# Patient Record
Sex: MALE | Race: WHITE | NOT HISPANIC OR LATINO | Employment: FULL TIME | URBAN - METROPOLITAN AREA
[De-identification: names, ages, dates, MRNs, and addresses within clinical notes are randomized per-mention and may not be internally consistent; named-entity substitution may affect disease eponyms.]

---

## 2018-01-03 ENCOUNTER — OFFICE VISIT (OUTPATIENT)
Dept: URGENT CARE | Facility: CLINIC | Age: 28
End: 2018-01-03
Payer: COMMERCIAL

## 2018-01-03 PROCEDURE — G0382 LEV 3 HOSP TYPE B ED VISIT: HCPCS

## 2018-01-03 PROCEDURE — 99283 EMERGENCY DEPT VISIT LOW MDM: CPT

## 2018-01-04 NOTE — PROGRESS NOTES
Assessment   1  Sensation of foreign body in throat (629 21) (N82 94)    Discussion/Summary   Discussion Summary:    Pt to ED for further evaluation  Chief Complaint   1  Cough  Chief Complaint Free Text Note Form: Patient relates woke up coughing this morning and has not been sick  Feels like something is lodged in his throat  Speaking in full sentences no acute distress  History of Present Illness   HPI: A 43-year-old male presents with cough and foreign body sensation in throat since waking this am     Hospital Based Practices Required Assessment: Reason DV Screen not done: family at bedside       Depression And Suicide Screen  Reason suicide screen not done: family at bedside  Prefered Language is  english  Primary Language is  english  Review of Systems   Focused-Male:      Constitutional: no fever or chills, feels well, no tiredness, no recent weight loss or weight gain  ENT: no complaints of earache, no loss of hearing, no nosebleeds or nasal discharge, no sore throat or hoarseness  Cardiovascular: no complaints of slow or fast heart rate, no chest pain, no palpitations, no leg claudication or lower extremity edema  Respiratory: cough, but-- no shortness of breath,-- no orthopnea,-- no wheezing,-- no shortness of breath during exertion-- and-- no PND  Gastrointestinal: no complaints of abdominal pain, no constipation, no nausea or vomiting, no diarrhea or bloody stools  Genitourinary: no complaints of dysuria or incontinence, no hesitancy, no nocturia, no genital lesion, no inadequacy of penile erection  Musculoskeletal: no complaints of arthralgia, no myalgia, no joint swelling or stiffness, no limb pain or swelling  Integumentary: no complaints of skin rash or lesion, no itching or dry skin, no skin wounds  Neurological: no complaints of headache, no confusion, no numbness or tingling, no dizziness or fainting      ROS Reviewed:    ROS reviewed  Active Problems   1  Sensation of foreign body in throat (784 99) (R09 89)    Past Medical History   1  No pertinent past medical history  Active Problems And Past Medical History Reviewed: The active problems and past medical history were reviewed and updated today  Family History   Father    1  Family history of diabetes mellitus (V18 0) (Z83 3)   2  Family history of non-Hodgkin's lymphoma (V16 7) (Z80 7)  Family History Reviewed: The family history was reviewed and updated today  Social History    · Current every day smoker (305 1) (F17 200)  Social History Reviewed: The social history was reviewed and is unchanged  Surgical History   1  History of Hernia Repair   2  History of Shoulder Surgery Left  Surgical History Reviewed: The surgical history was reviewed and updated today  Current Meds    1  No Reported Medications Recorded  Medication List Reviewed: The medication list was reviewed and updated today  Allergies   1  No Known Drug Allergies    Vitals   Signs   Recorded: [id number] 12:27PM   Temperature: 99 2 F, Tympanic  Heart Rate: 86  Respiration: 18  Systolic: 823, RUE, Sitting  Diastolic: 98, RUE, Sitting  Height: 5 ft 4 in  Weight: 332 lb   BMI Calculated: 56 99  BSA Calculated: 2 43  O2 Saturation: 97, RA  Pain Scale: 5    Physical Exam        Constitutional      General appearance: No acute distress, well appearing and well nourished  Ears, Nose, Mouth, and Throat      External inspection of ears and nose: Normal        Otoscopic examination: Tympanic membrance translucent with normal light reflex  Canals patent without erythema  Nasal mucosa, septum, and turbinates: Normal without edema or erythema  Oropharynx: Abnormal   The posterior pharynx edematous oropharynx, but-- was not erythematous-- and-- did not have an exudate  Pulmonary      Respiratory effort: No increased work of breathing or signs of respiratory distress  Auscultation of lungs: Clear to auscultation  Cardiovascular      Palpation of heart: Normal PMI, no thrills  Auscultation of heart: Normal rate and rhythm, normal S1 and S2, without murmurs  Abdomen      Abdomen: Non-tender, no masses  Lymphatic      Palpation of lymph nodes in neck: No lymphadenopathy  Skin      Skin and subcutaneous tissue: Normal without rashes or lesions         Psychiatric      Orientation to person, place and time: Normal        Mood and affect: Normal        Signatures    Electronically signed by : MORGAN Barrera; Robi  3 2018  2:45PM EST                       (Author)     Electronically signed by : Scott Cushing, D O ; Robi  3 2018  5:28PM EST                       (Co-author)

## 2018-01-23 VITALS
HEIGHT: 64 IN | SYSTOLIC BLOOD PRESSURE: 166 MMHG | RESPIRATION RATE: 18 BRPM | OXYGEN SATURATION: 97 % | DIASTOLIC BLOOD PRESSURE: 98 MMHG | WEIGHT: 315 LBS | BODY MASS INDEX: 53.78 KG/M2 | HEART RATE: 86 BPM | TEMPERATURE: 99.2 F

## 2021-01-06 ENCOUNTER — NURSE TRIAGE (OUTPATIENT)
Dept: OTHER | Facility: OTHER | Age: 31
End: 2021-01-06

## 2021-01-06 DIAGNOSIS — Z20.822 EXPOSURE TO COVID-19 VIRUS: Primary | ICD-10-CM

## 2021-01-06 DIAGNOSIS — Z20.822 EXPOSURE TO COVID-19 VIRUS: ICD-10-CM

## 2021-01-06 PROCEDURE — U0005 INFEC AGEN DETEC AMPLI PROBE: HCPCS | Performed by: FAMILY MEDICINE

## 2021-01-06 PROCEDURE — U0003 INFECTIOUS AGENT DETECTION BY NUCLEIC ACID (DNA OR RNA); SEVERE ACUTE RESPIRATORY SYNDROME CORONAVIRUS 2 (SARS-COV-2) (CORONAVIRUS DISEASE [COVID-19]), AMPLIFIED PROBE TECHNIQUE, MAKING USE OF HIGH THROUGHPUT TECHNOLOGIES AS DESCRIBED BY CMS-2020-01-R: HCPCS | Performed by: FAMILY MEDICINE

## 2021-01-06 NOTE — TELEPHONE ENCOUNTER
No return call    Pt is requesting a covid test and does not have a [facility] PCP   Order placed   Pt informed of closest testing site and was advised of hours of operation, address, to wear a mask, and to stay in the car   Pt verbalized understanding       Link sent to pts email address to create a [facility] account to check for results  Reason for Disposition   [1] COVID-19 infection suspected by caller or triager AND [2] mild symptoms (cough, fever, or others) AND [0] no complications or SOB    Answer Assessment - Initial Assessment Questions  Were you within 6 feet or less, for up to 15 minutes or more with a person that has a confirmed COVID-19 test? Unknown    What was the date of your exposure?  Unknown     Are you experiencing any symptoms attributed to the virus?  (Assess for SOB, cough, fever, difficulty breathing) headache, fatigue, cough, diarrhea, body aches, congestion     HIGH RISK: Do you have any history heart or lung conditions, weakened immune system, diabetes, Asthma, CHF, HIV, COPD, Chemo, renal failure, sickle cell, etc? Denies    Protocols used: CORONAVIRUS (COVID-19)  DIAGNOSED OR SUSPECTED-ADULT-[place]

## 2021-01-06 NOTE — TELEPHONE ENCOUNTER
Regarding: COVID - Symptomatic - Headache, cough  ----- Message from [employer] sent at 1/6/2021  2:32 PM EST -----  "I haven't felt well the past day and half  Yesterday I had the worst headache of my life, I've been sleeping a lot, have loose stool, and a little cough   I would like to get tested"

## 2021-01-08 LAB — SARS-COV-2 RNA SPEC QL NAA+PROBE: NOT DETECTED
